# Patient Record
Sex: MALE
[De-identification: names, ages, dates, MRNs, and addresses within clinical notes are randomized per-mention and may not be internally consistent; named-entity substitution may affect disease eponyms.]

---

## 2018-02-05 ENCOUNTER — TRANSCRIPTION ENCOUNTER (OUTPATIENT)
Age: 43
End: 2018-02-05

## 2018-02-05 ENCOUNTER — APPOINTMENT (OUTPATIENT)
Dept: INTERNAL MEDICINE | Facility: CLINIC | Age: 43
End: 2018-02-05
Payer: MEDICAID

## 2018-02-05 VITALS
DIASTOLIC BLOOD PRESSURE: 90 MMHG | HEIGHT: 69 IN | SYSTOLIC BLOOD PRESSURE: 140 MMHG | TEMPERATURE: 97.6 F | BODY MASS INDEX: 25.18 KG/M2 | WEIGHT: 170 LBS | HEART RATE: 90 BPM | OXYGEN SATURATION: 98 %

## 2018-02-05 DIAGNOSIS — Z83.3 FAMILY HISTORY OF DIABETES MELLITUS: ICD-10-CM

## 2018-02-05 DIAGNOSIS — F90.8 ATTENTION-DEFICIT HYPERACTIVITY DISORDER, OTHER TYPE: ICD-10-CM

## 2018-02-05 DIAGNOSIS — Z00.00 ENCOUNTER FOR GENERAL ADULT MEDICAL EXAMINATION W/OUT ABNORMAL FINDINGS: ICD-10-CM

## 2018-02-05 DIAGNOSIS — Z82.49 FAMILY HISTORY OF ISCHEMIC HEART DISEASE AND OTHER DISEASES OF THE CIRCULATORY SYSTEM: ICD-10-CM

## 2018-02-05 DIAGNOSIS — Z87.891 PERSONAL HISTORY OF NICOTINE DEPENDENCE: ICD-10-CM

## 2018-02-05 DIAGNOSIS — Z84.89 FAMILY HISTORY OF OTHER SPECIFIED CONDITIONS: ICD-10-CM

## 2018-02-05 PROCEDURE — 99386 PREV VISIT NEW AGE 40-64: CPT | Mod: 25

## 2018-02-05 PROCEDURE — 99212 OFFICE O/P EST SF 10 MIN: CPT | Mod: 25

## 2018-02-05 PROCEDURE — 36415 COLL VENOUS BLD VENIPUNCTURE: CPT

## 2018-02-05 RX ORDER — DEXTROAMPHETAMINE SACCHARATE, AMPHETAMINE ASPARTATE, DEXTROAMPHETAMINE SULFATE AND AMPHETAMINE SULFATE 2.5; 2.5; 2.5; 2.5 MG/1; MG/1; MG/1; MG/1
10 TABLET ORAL
Refills: 0 | Status: DISCONTINUED | COMMUNITY
Start: 2018-02-05 | End: 2018-02-05

## 2018-02-06 ENCOUNTER — RX RENEWAL (OUTPATIENT)
Age: 43
End: 2018-02-06

## 2018-02-06 ENCOUNTER — TRANSCRIPTION ENCOUNTER (OUTPATIENT)
Age: 43
End: 2018-02-06

## 2018-02-06 LAB
ALBUMIN SERPL ELPH-MCNC: 5.3 G/DL
ALP BLD-CCNC: 54 U/L
ALT SERPL-CCNC: 18 U/L
ANION GAP SERPL CALC-SCNC: 21 MMOL/L
APPEARANCE: CLEAR
AST SERPL-CCNC: 29 U/L
BASOPHILS # BLD AUTO: 0.05 K/UL
BASOPHILS NFR BLD AUTO: 0.5 %
BILIRUB SERPL-MCNC: 0.4 MG/DL
BILIRUBIN URINE: NEGATIVE
BLOOD URINE: NEGATIVE
BUN SERPL-MCNC: 12 MG/DL
CALCIUM SERPL-MCNC: 10.2 MG/DL
CHLORIDE SERPL-SCNC: 94 MMOL/L
CHOLEST SERPL-MCNC: 202 MG/DL
CHOLEST/HDLC SERPL: 2.4 RATIO
CO2 SERPL-SCNC: 23 MMOL/L
COLOR: YELLOW
CREAT SERPL-MCNC: 1.12 MG/DL
EOSINOPHIL # BLD AUTO: 0.16 K/UL
EOSINOPHIL NFR BLD AUTO: 1.7 %
GLUCOSE QUALITATIVE U: NEGATIVE MG/DL
GLUCOSE SERPL-MCNC: 76 MG/DL
HBA1C MFR BLD HPLC: 5.2 %
HCT VFR BLD CALC: 45.5 %
HDLC SERPL-MCNC: 83 MG/DL
HGB BLD-MCNC: 15.6 G/DL
HIV1+2 AB SPEC QL IA.RAPID: NONREACTIVE
HSV 1+2 IGG SER IA-IMP: NEGATIVE
HSV 1+2 IGG SER IA-IMP: POSITIVE
HSV1 IGG SER QL: 24.1 INDEX
HSV2 IGG SER QL: 0.08 INDEX
IMM GRANULOCYTES NFR BLD AUTO: 0.3 %
KETONES URINE: NEGATIVE
LDLC SERPL CALC-MCNC: 95 MG/DL
LEUKOCYTE ESTERASE URINE: NEGATIVE
LYMPHOCYTES # BLD AUTO: 2.52 K/UL
LYMPHOCYTES NFR BLD AUTO: 26.9 %
MAN DIFF?: NORMAL
MCHC RBC-ENTMCNC: 32.6 PG
MCHC RBC-ENTMCNC: 34.3 GM/DL
MCV RBC AUTO: 95 FL
MONOCYTES # BLD AUTO: 0.91 K/UL
MONOCYTES NFR BLD AUTO: 9.7 %
NEUTROPHILS # BLD AUTO: 5.71 K/UL
NEUTROPHILS NFR BLD AUTO: 60.9 %
NITRITE URINE: NEGATIVE
PH URINE: 5
PLATELET # BLD AUTO: 320 K/UL
POTASSIUM SERPL-SCNC: 4.5 MMOL/L
PROT SERPL-MCNC: 8.2 G/DL
PROTEIN URINE: NEGATIVE MG/DL
RBC # BLD: 4.79 M/UL
RBC # FLD: 14 %
SODIUM SERPL-SCNC: 138 MMOL/L
SPECIFIC GRAVITY URINE: 1
T PALLIDUM AB SER QL IA: NEGATIVE
TRIGL SERPL-MCNC: 119 MG/DL
UROBILINOGEN URINE: NEGATIVE MG/DL
WBC # FLD AUTO: 9.38 K/UL

## 2018-02-08 ENCOUNTER — TRANSCRIPTION ENCOUNTER (OUTPATIENT)
Age: 43
End: 2018-02-08

## 2018-02-09 ENCOUNTER — TRANSCRIPTION ENCOUNTER (OUTPATIENT)
Age: 43
End: 2018-02-09

## 2022-01-20 ENCOUNTER — NON-APPOINTMENT (OUTPATIENT)
Age: 47
End: 2022-01-20

## 2022-01-27 ENCOUNTER — RESULT REVIEW (OUTPATIENT)
Age: 47
End: 2022-01-27

## 2022-01-27 ENCOUNTER — TRANSCRIPTION ENCOUNTER (OUTPATIENT)
Age: 47
End: 2022-01-27

## 2022-01-27 ENCOUNTER — APPOINTMENT (OUTPATIENT)
Dept: ORTHOPEDIC SURGERY | Facility: CLINIC | Age: 47
End: 2022-01-27
Payer: MEDICAID

## 2022-01-27 ENCOUNTER — OUTPATIENT (OUTPATIENT)
Dept: OUTPATIENT SERVICES | Facility: HOSPITAL | Age: 47
LOS: 1 days | End: 2022-01-27
Payer: MEDICAID

## 2022-01-27 DIAGNOSIS — S82.831A OTHER FRACTURE OF UPPER AND LOWER END OF RIGHT FIBULA, INITIAL ENCOUNTER FOR CLOSED FRACTURE: ICD-10-CM
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
## 2022-01-27 PROCEDURE — 73610 X-RAY EXAM OF ANKLE: CPT | Mod: 26,RT

## 2022-01-27 PROCEDURE — 77071 MNL APPL STRS JT RADIOGRAPHY: CPT

## 2022-01-27 PROCEDURE — 73610 X-RAY EXAM OF ANKLE: CPT

## 2022-01-27 PROCEDURE — 99204 OFFICE O/P NEW MOD 45 MIN: CPT | Mod: 57

## 2022-01-27 PROCEDURE — 27786 TREATMENT OF ANKLE FRACTURE: CPT

## 2022-01-27 RX ORDER — DEXTROAMPHETAMINE SULFATE 10 MG/1
10 TABLET ORAL 3 TIMES DAILY
Qty: 90 | Refills: 0 | Status: DISCONTINUED | COMMUNITY
Start: 2018-02-07 | End: 2022-01-27

## 2022-01-27 RX ORDER — DEXTROAMPHETAMINE SACCHARATE, AMPHETAMINE ASPARTATE, DEXTROAMPHETAMINE SULFATE AND AMPHETAMINE SULFATE 2.5; 2.5; 2.5; 2.5 MG/1; MG/1; MG/1; MG/1
10 TABLET ORAL 3 TIMES DAILY
Qty: 90 | Refills: 0 | Status: DISCONTINUED | COMMUNITY
Start: 2018-02-06 | End: 2022-01-27

## 2022-01-27 RX ORDER — DEXTROAMPHETAMINE SACCHARATE, AMPHETAMINE ASPARTATE, DEXTROAMPHETAMINE SULFATE AND AMPHETAMINE SULFATE 2.5; 2.5; 2.5; 2.5 MG/1; MG/1; MG/1; MG/1
10 TABLET ORAL 3 TIMES DAILY
Qty: 90 | Refills: 0 | Status: DISCONTINUED | COMMUNITY
Start: 2018-02-05 | End: 2022-01-27

## 2022-03-02 ENCOUNTER — APPOINTMENT (OUTPATIENT)
Dept: ORTHOPEDIC SURGERY | Facility: CLINIC | Age: 47
End: 2022-03-02
Payer: MEDICAID

## 2022-03-02 ENCOUNTER — RESULT REVIEW (OUTPATIENT)
Age: 47
End: 2022-03-02

## 2022-03-02 ENCOUNTER — OUTPATIENT (OUTPATIENT)
Dept: OUTPATIENT SERVICES | Facility: HOSPITAL | Age: 47
LOS: 1 days | End: 2022-03-02
Payer: MEDICAID

## 2022-03-02 PROCEDURE — 73610 X-RAY EXAM OF ANKLE: CPT | Mod: 26,RT

## 2022-03-02 PROCEDURE — 99024 POSTOP FOLLOW-UP VISIT: CPT

## 2022-03-02 PROCEDURE — 73610 X-RAY EXAM OF ANKLE: CPT

## 2022-03-23 ENCOUNTER — APPOINTMENT (OUTPATIENT)
Dept: ORTHOPEDIC SURGERY | Facility: CLINIC | Age: 47
End: 2022-03-23
Payer: MEDICAID

## 2022-03-23 DIAGNOSIS — Q68.8 OTHER SPECIFIED CONGENITAL MUSCULOSKELETAL DEFORMITIES: ICD-10-CM

## 2022-03-23 DIAGNOSIS — M25.571 PAIN IN RIGHT ANKLE AND JOINTS OF RIGHT FOOT: ICD-10-CM

## 2022-03-23 DIAGNOSIS — M54.50 LOW BACK PAIN, UNSPECIFIED: ICD-10-CM

## 2022-03-23 PROCEDURE — 99024 POSTOP FOLLOW-UP VISIT: CPT

## 2022-03-24 ENCOUNTER — APPOINTMENT (OUTPATIENT)
Dept: NEUROLOGY | Facility: CLINIC | Age: 47
End: 2022-03-24
Payer: MEDICAID

## 2022-03-24 ENCOUNTER — NON-APPOINTMENT (OUTPATIENT)
Age: 47
End: 2022-03-24

## 2022-03-24 VITALS
DIASTOLIC BLOOD PRESSURE: 83 MMHG | TEMPERATURE: 97.2 F | BODY MASS INDEX: 25.92 KG/M2 | WEIGHT: 175 LBS | OXYGEN SATURATION: 97 % | SYSTOLIC BLOOD PRESSURE: 133 MMHG | HEART RATE: 78 BPM | HEIGHT: 69 IN

## 2022-03-24 PROCEDURE — 99204 OFFICE O/P NEW MOD 45 MIN: CPT

## 2022-03-24 RX ORDER — ACETAMINOPHEN AND CODEINE 300; 30 MG/1; MG/1
300-30 TABLET ORAL
Qty: 40 | Refills: 0 | Status: DISCONTINUED | COMMUNITY
Start: 2022-01-27 | End: 2022-03-24

## 2022-03-24 RX ORDER — ACETAMINOPHEN 325 MG/1
TABLET, FILM COATED ORAL
Refills: 0 | Status: DISCONTINUED | COMMUNITY
End: 2022-03-24

## 2022-03-24 RX ORDER — CHOLECALCIFEROL (VITAMIN D3) 1250 MCG
1.25 MG CAPSULE ORAL
Qty: 10 | Refills: 0 | Status: ACTIVE | COMMUNITY
Start: 2022-01-27 | End: 1900-01-01

## 2022-03-24 RX ORDER — MELOXICAM 15 MG/1
15 TABLET ORAL
Qty: 30 | Refills: 2 | Status: ACTIVE | COMMUNITY
Start: 2022-03-23 | End: 1900-01-01

## 2022-03-24 NOTE — DISCUSSION/SUMMARY
[FreeTextEntry1] : 45 y/o male with hx of Right Queen B fibula Fx\par Neuro sensory changes R peroneal nerve.\par No motor weakness.\par Minimal R ulnar nerve symptoms

## 2022-03-24 NOTE — HISTORY OF PRESENT ILLNESS
[FreeTextEntry1] : First visit\par \par 46 year old man presents for an evaluation of right minimally displaced Queen B distal fibula fracture.Rght ankle pain. He reports that he slipped and fell injuring his RLE in early January 22. Denies hitting his head or LOC. \par \par Saw Dr Wells who has been tx hi. \par \par Reports some decrease sensation over right peroneal superficial area. No leg weakness\par Pain improving\par Also having some decrease sensation ulnar area Right hand. \par \par  3/23/22 MRI [w/o contrast] of right ankle performed at [[facility]  } on [3/11/22] showed\par IMPRESSION FROM R:\par 1. Mild pes planovalgus deformity.\par 2. Healing nondisplaced oblique (Queen B) fracture of the distal fibula.\par 3. Partially healed nondisplaced fracture of the tibial posterior malleolus; the fracture fragment corresponds to the\par "footprint" of the posterior tibiofibular ligament complex.\par 4. Moderate-sized os trigonum, with evidence of "os trigonum syndrome".\par 5. Healing mild sprain of the ATFL. The CFL is thickened and scarred.\par 6. Healing mild sprain of the deep deltoid ligament; the superficial components of the deltoid ligament are uninjured.\par 7. Subtle early arthrosis in the ankle joint.\par 8. Study negative for osteochondral lesion. Also, the anterior tibiofibular ligament and syndesmotic membrane appear\par uninjured.\par \par Able to ambulate w cane.

## 2022-03-24 NOTE — PHYSICAL EXAM
[Person] : oriented to person [Short Term Intact] : short term memory intact [Naming Objects] : no difficulty naming common objects [Fluency] : fluency intact [Comprehension] : comprehension intact [Motor Tone] : muscle tone was normal in all four extremities [Motor Strength] : muscle strength was normal in all four extremities [Motor Handedness Right-Handed] : the patient is right hand dominant [Sensation Vibration Decrease] : vibration was intact [Tactile Decrease Deep Peroneal Nerve Right Leg] : diminished right web space between the 1st and 2nd toes [Tactile Decrease Hand Ulnar 1 1/2 Digits] : normal left ulnar 1-1/2 digits [Tactile Decrease Superficial Peroneal Nerve Right Leg] : diminished right dorsum of the foot

## 2022-03-29 VITALS — HEIGHT: 69 IN | BODY MASS INDEX: 25.92 KG/M2 | WEIGHT: 175 LBS | RESPIRATION RATE: 16 BRPM

## 2022-03-30 ENCOUNTER — TRANSCRIPTION ENCOUNTER (OUTPATIENT)
Age: 47
End: 2022-03-30

## 2022-04-04 ENCOUNTER — APPOINTMENT (OUTPATIENT)
Dept: NEUROLOGY | Facility: AMBULATORY SURGERY CENTER | Age: 47
End: 2022-04-04
Payer: MEDICAID

## 2022-04-04 ENCOUNTER — APPOINTMENT (OUTPATIENT)
Dept: PHYSICAL MEDICINE AND REHAB | Facility: CLINIC | Age: 47
End: 2022-04-04
Payer: MEDICAID

## 2022-04-04 VITALS
OXYGEN SATURATION: 97 % | DIASTOLIC BLOOD PRESSURE: 79 MMHG | HEIGHT: 69 IN | HEART RATE: 61 BPM | SYSTOLIC BLOOD PRESSURE: 113 MMHG | BODY MASS INDEX: 25.92 KG/M2 | WEIGHT: 175 LBS

## 2022-04-04 DIAGNOSIS — G56.10 OTHER LESIONS OF MEDIAN NERVE, UNSPECIFIED UPPER LIMB: ICD-10-CM

## 2022-04-04 DIAGNOSIS — G57.90 UNSPECIFIED MONONEUROPATHY OF UNSPECIFIED LOWER LIMB: ICD-10-CM

## 2022-04-04 DIAGNOSIS — G57.31 LESION OF LATERAL POPLITEAL NERVE, RIGHT LOWER LIMB: ICD-10-CM

## 2022-04-04 PROCEDURE — 95886 MUSC TEST DONE W/N TEST COMP: CPT

## 2022-04-04 PROCEDURE — 99214 OFFICE O/P EST MOD 30 MIN: CPT

## 2022-04-04 PROCEDURE — 99202 OFFICE O/P NEW SF 15 MIN: CPT

## 2022-04-04 PROCEDURE — 95913 NRV CNDJ TEST 13/> STUDIES: CPT

## 2022-04-04 NOTE — HISTORY OF PRESENT ILLNESS
[FreeTextEntry1] : Clinical neurophysiology consultation\par \par KOMAL STACY is a 46 year who presents with right foot and right hand symptoms.  Referred by Dr Elilot Gross for evaluation and EMG/NCS\par \par He had a fibular fracture in early Jan and has numbness on the dorsum of the foot. His leg was extremely swollen and he could not get out of bed for a long period of time after the accident.  Also fell onto the right wrist\par \par \par Over time feels like things worsening.  Now also intermittent numbness on the inner foot for a couple days as well.  The right arm also feels tight and swollen especially in the morning.  Also some pain radiating down the leg. Also right leg somewhat discolored\par \par Denies diplopia, blurred vision, dysphagia, dysarthria, aphasia, bowel or bladder dysfunction, other falls, headaches.  Feels like balance is not okay due to the right foot.  Feels like going down stairs is not normal.  \par

## 2022-04-04 NOTE — PHYSICAL EXAM
[FreeTextEntry1] : General: this is a pleasant patient in no acute distress\par \par HEENT conjunctiva are normal, no tenderness in head\par \par CV: normal pulses, regular rate and rhythm, no peripheral edema noted\par \par Lungs: breathing is non-labored\par \par abd: soft and non-distended\par \par MSK:\par SLR: \par VELMA:\par range of motion:\par tinnels: + around fib head but not only there.\par spurling:\par Occipital nerve tenderness:\par \par Mental status:\par Alert and oriented to person, place and time, normal speech and comprehension\par \par Cranial Nerves:\par extra-occular movements in tact without nystagmus, normal saccades and smooth pursuit, Face symmetric and facial strength symmetric, facial sensation symmetric, \par \par Motor: normal bulk and tone throughout. no abnormal movements.  Full 5/5 strength uppers and lower extremities proximally and distally\par \par Sensory: in tact and symmetric to vibration, light tough, temperature except decreased in whole foot to PP, LT, temp, and decreased in R 5th digit but not on dorsum of hand\par \par Cerebellar: normal finger-nose-finger bilaterally\par \par Reflexes: 2+ in the upper and lower extremities and symmetric.  toes are bilaterally downgoing.\par \par Gait: stable, able to tip toe heel and tandem\par \par Stances:\par Romberg: normal\par l\par \par

## 2022-04-07 NOTE — PHYSICAL EXAM
[FreeTextEntry1] : GEN: AAOx3, NAD.\par INSP: Lumbar - Spine alignment is midline\par STANCE: No Trendelenburg with single leg stance.\par GAIT: (+) antalgic, normal reciprocating heel to toe\par LUMBAR AROM: Flexion, extension, side-bending, rotation full and pain free.  \par Movement: Two-Leg Squat WNL. Single Leg Squat WNL.

## 2022-04-07 NOTE — ASSESSMENT
[FreeTextEntry1] : Impression:\par 1. RIGHT Sural Neuropathy\par \par Plan: The history and physical examination were reviewed. The diagnosis seems unlikely spine in etiology and most consistent with the current diagnosis of peripheral neuropathy. I have advised the patient to follow up with Neurology moving forward. If any of his LBP symptoms are to return he may follow up here for further evaluation. The patient expressed verbal understanding and is in agreement with the plan of care. All of the patient's questions and concerns were addressed during today's visit.

## 2022-04-07 NOTE — HISTORY OF PRESENT ILLNESS
[FreeTextEntry1] : Referring Physician: Dr. Haroon Wells MD \par \par Mr. KOMAL STACY is a very pleasant 46 year male who comes in for evaluation of Right Leg Pain that has been ongoing for 1.5 months due to a fall that happened on 1/8/22. The patient initially had some LBP however that has since resolved. He has also seen Neurology and EMG/NCV findings are suggestive of peripheral neuropathy. The pain is located primarily RIGHT LEG pain radiating up from the foot to knee/thigh, intermittent and described as sharp. The pain is rated as 3/10 and ranges from 1-4/10. The patient's symptoms are aggravated by walking,walking, getting out out bed and alleviated by Cold/Heat compress. The patient works as musician which consists of standing. The patient feels numbness/tingling, weakness, but denies any night pain or bowel/bladder dysfunction. The patient has no other complaints at this time.\par \par \par \par

## 2022-04-08 ENCOUNTER — APPOINTMENT (OUTPATIENT)
Dept: ORTHOPEDIC SURGERY | Facility: CLINIC | Age: 47
End: 2022-04-08
Payer: MEDICAID

## 2022-04-08 VITALS — RESPIRATION RATE: 16 BRPM | HEIGHT: 69 IN | WEIGHT: 175 LBS | BODY MASS INDEX: 25.92 KG/M2

## 2022-04-08 DIAGNOSIS — M79.641 PAIN IN RIGHT HAND: ICD-10-CM

## 2022-04-08 DIAGNOSIS — M79.644 PAIN IN RIGHT FINGER(S): ICD-10-CM

## 2022-04-08 DIAGNOSIS — G56.21 LESION OF ULNAR NERVE, RIGHT UPPER LIMB: ICD-10-CM

## 2022-04-08 PROCEDURE — 99213 OFFICE O/P EST LOW 20 MIN: CPT | Mod: 24

## 2022-04-08 PROCEDURE — 73140 X-RAY EXAM OF FINGER(S): CPT | Mod: F5

## 2022-04-08 PROCEDURE — 73110 X-RAY EXAM OF WRIST: CPT | Mod: RT

## 2022-04-08 PROCEDURE — 73070 X-RAY EXAM OF ELBOW: CPT | Mod: RT

## 2022-05-04 ENCOUNTER — APPOINTMENT (OUTPATIENT)
Dept: ORTHOPEDIC SURGERY | Facility: CLINIC | Age: 47
End: 2022-05-04
Payer: MEDICAID

## 2022-05-04 VITALS — RESPIRATION RATE: 16 BRPM | HEIGHT: 69 IN | BODY MASS INDEX: 25.92 KG/M2 | WEIGHT: 175 LBS

## 2022-05-04 DIAGNOSIS — S82.831A OTHER FRACTURE OF UPPER AND LOWER END OF RIGHT FIBULA, INITIAL ENCOUNTER FOR CLOSED FRACTURE: ICD-10-CM

## 2022-05-04 DIAGNOSIS — S82.391A OTHER FRACTURE OF LOWER END OF RIGHT TIBIA, INITIAL ENCOUNTER FOR CLOSED FRACTURE: ICD-10-CM

## 2022-05-04 PROCEDURE — 99213 OFFICE O/P EST LOW 20 MIN: CPT

## 2022-09-26 ENCOUNTER — APPOINTMENT (OUTPATIENT)
Dept: NEUROLOGY | Facility: AMBULATORY SURGERY CENTER | Age: 47
End: 2022-09-26

## 2022-09-26 DIAGNOSIS — G56.11 OTHER LESIONS OF MEDIAN NERVE, RIGHT UPPER LIMB: ICD-10-CM

## 2022-09-26 DIAGNOSIS — G57.81 OTHER SPECIFIED MONONEUROPATHIES OF RIGHT LOWER LIMB: ICD-10-CM

## 2022-09-26 DIAGNOSIS — G56.21 LESION OF ULNAR NERVE, RIGHT UPPER LIMB: ICD-10-CM

## 2022-09-26 PROCEDURE — 99214 OFFICE O/P EST MOD 30 MIN: CPT

## 2022-09-26 NOTE — HISTORY OF PRESENT ILLNESS
[FreeTextEntry1] : KOMAL STACY is a 47 year who returns to follow up for \par \par last visit was 4/4/2022\par \par since last visit spontaneously lost hearing on R.  still has abnormal right sided sensation.  still some heat in the foot and it bothers him when carrying daughter.  symptoms basically stable\par \par \par

## 2022-09-26 NOTE — PHYSICAL EXAM
[FreeTextEntry1] : General: this is a pleasant patient in no acute distress\par \par HEENT conjunctiva are normal, no tenderness in head\par \par CV: normal pulses, regular rate and rhythm, no peripheral edema noted\par \par Lungs: breathing is non-labored\par \par abd: soft and non-distended\par \par MSK:\par SLR: \par VELMA:\par range of motion:\par tinnels: neg\par spurling:\par Occipital nerve tenderness:\par \par Mental status:\par Alert and oriented to person, place and time, normal speech and comprehension\par \par Cranial Nerves:\par extra-occular movements in tact without nystagmus, normal saccades and smooth pursuit, Face symmetric and facial strength symmetric, facial sensation symmetric, \par \par Motor: normal bulk and tone throughout. no abnormal movements.  Full 5/5 strength uppers and lower extremities proximally and distally\par \par Sensory: in tact and symmetric to vibration, light tough, temperature except decreased in whole foot to PP, LT, temp, but present sensation despite mild stimulation\par \par Cerebellar: normal finger-nose-finger bilaterally\par \par Reflexes: 2+ in the upper and lower extremities and symmetric.  toes are bilaterally downgoing.\par \par Gait: stable, able to tip toe heel and tandem\par \par Stances:\par Romberg: normal\par l\par \par